# Patient Record
Sex: FEMALE | Race: WHITE
[De-identification: names, ages, dates, MRNs, and addresses within clinical notes are randomized per-mention and may not be internally consistent; named-entity substitution may affect disease eponyms.]

---
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
## 2019-01-11 ENCOUNTER — HOSPITAL ENCOUNTER (EMERGENCY)
Dept: HOSPITAL 12 - ER | Age: 19
Discharge: HOME | End: 2019-01-11
Payer: COMMERCIAL

## 2019-01-11 VITALS — WEIGHT: 160 LBS | HEIGHT: 63 IN | BODY MASS INDEX: 28.35 KG/M2

## 2019-01-11 DIAGNOSIS — M62.838: Primary | ICD-10-CM

## 2019-01-11 DIAGNOSIS — Z88.1: ICD-10-CM

## 2019-01-11 PROCEDURE — A4663 DIALYSIS BLOOD PRESSURE CUFF: HCPCS

## 2019-01-11 PROCEDURE — 96372 THER/PROPH/DIAG INJ SC/IM: CPT

## 2019-01-11 PROCEDURE — 99283 EMERGENCY DEPT VISIT LOW MDM: CPT

## 2019-01-11 NOTE — NUR
Patient discharged to home in stable conditon.  Written and verbal after care 
instructions given. 

Patient verbalizes understanding of instructions. Pt. d/c w/ prescription per 
MD order, d/c papers signed, all belongings w/ pt., ID band removed, ambulated 
off unit w/ steady gait, left w/ father, no acute distress,

## 2019-01-11 NOTE — NUR
Pt. ambulated into ED w/ c/o general neck pain since exercising at gym @ 
approx. 1800, denies HA/F/C/N/V

## 2019-06-15 ENCOUNTER — HOSPITAL ENCOUNTER (EMERGENCY)
Dept: HOSPITAL 12 - ER | Age: 19
Discharge: HOME | End: 2019-06-15
Payer: COMMERCIAL

## 2019-06-15 VITALS — BODY MASS INDEX: 29.44 KG/M2 | HEIGHT: 62 IN | WEIGHT: 160 LBS

## 2019-06-15 DIAGNOSIS — Z88.1: ICD-10-CM

## 2019-06-15 DIAGNOSIS — B97.89: ICD-10-CM

## 2019-06-15 DIAGNOSIS — J02.8: Primary | ICD-10-CM

## 2019-06-15 PROCEDURE — A4663 DIALYSIS BLOOD PRESSURE CUFF: HCPCS
